# Patient Record
Sex: FEMALE | Race: OTHER
[De-identification: names, ages, dates, MRNs, and addresses within clinical notes are randomized per-mention and may not be internally consistent; named-entity substitution may affect disease eponyms.]

---

## 2021-04-15 PROBLEM — Z00.00 ENCOUNTER FOR PREVENTIVE HEALTH EXAMINATION: Status: ACTIVE | Noted: 2021-04-15

## 2021-04-16 ENCOUNTER — APPOINTMENT (OUTPATIENT)
Dept: NEUROLOGY | Facility: CLINIC | Age: 66
End: 2021-04-16
Payer: MEDICARE

## 2021-04-16 PROCEDURE — 99204 OFFICE O/P NEW MOD 45 MIN: CPT | Mod: 95

## 2021-04-16 RX ORDER — APIXABAN 5 MG/1
5 TABLET, FILM COATED ORAL TWICE DAILY
Qty: 60 | Refills: 1 | Status: ACTIVE | COMMUNITY
Start: 2021-04-16 | End: 1900-01-01

## 2021-04-16 NOTE — HISTORY OF PRESENT ILLNESS
[FreeTextEntry1] : Pt is 66 yo RH F with hx of high chol seen for recent stroke\par \par Pt seen via telehealth. two separate attempts with video conference (jaison bassett) attempted, but pt unable to connect. Therefore, evaluation done over the phone\par \par Pt was taken to [facility] on 12/14/20 after family noted sudden onset of slurred speech. Pt was lkw around 8am, arrived at ER around 12-12:30.  As per hospital report, pt with initial NIHSS 4, and ct head finding of acute right basal ganglia bleed. tpa not given due to bleed, and pt was monitored over several days for stability of bleed, then discharged home with recommendation for neuro/cardio/med follow up.  Of note, pt noted with parox afib during the hospitalization.\par \par Pt reports no recurrent episodes since. Denies any prior focal weakness/numbness

## 2021-04-16 NOTE — REASON FOR VISIT
[Home] : at home, [unfilled] , at the time of the visit. [Medical Office: ([facility])___] : at the medical office located in  [Verbal consent obtained from patient] : the patient, [unfilled] [Initial Evaluation] : an initial evaluation

## 2021-04-16 NOTE — DISCUSSION/SUMMARY
[FreeTextEntry1] : Pt with hx of hemorrhagic stroke in Dec 2020, and inhospital afib\par \par Pt stable since the incident with repeat ct head done yesterday (3 months from episode) with no bleed.\par MRI head done at the time of the hospitalization showed right caudate acute infarct in additional to the right basal ganglia bleed.  Therefore, we cannot rule out possible stroke and hemorrhagic conversion for the recent right basal ganglia bleed\par case discussed with Dr Woodard (pt's cardiologist), who agrees on benefit of pt on ac\par pt planning to visit family in [place] for 1-2 months starting on Monday.  Eliwuis 5mg bid with 1 refil ordered\par pt recommended to follow up with me in 2-3 months.

## 2021-05-06 ENCOUNTER — APPOINTMENT (OUTPATIENT)
Dept: NEUROLOGY | Facility: CLINIC | Age: 66
End: 2021-05-06
Payer: MEDICARE

## 2021-05-06 VITALS
OXYGEN SATURATION: 99 % | HEART RATE: 78 BPM | TEMPERATURE: 97.8 F | DIASTOLIC BLOOD PRESSURE: 75 MMHG | HEIGHT: 65 IN | BODY MASS INDEX: 24.99 KG/M2 | WEIGHT: 150 LBS | SYSTOLIC BLOOD PRESSURE: 108 MMHG

## 2021-05-06 DIAGNOSIS — I48.91 UNSPECIFIED ATRIAL FIBRILLATION: ICD-10-CM

## 2021-05-06 DIAGNOSIS — I61.9 NONTRAUMATIC INTRACEREBRAL HEMORRHAGE, UNSPECIFIED: ICD-10-CM

## 2021-05-06 PROCEDURE — 99215 OFFICE O/P EST HI 40 MIN: CPT

## 2021-05-06 NOTE — ASSESSMENT
[FreeTextEntry1] : Pt with hx of afib and hemorrhagic stroke (thought to be possible hemorrhagic conversion), started on ac. stable neuro exam\par \par - cont with ac: pt wants to switch to xarelto due to insurance approval issue: will defer to pt's cardiologist\par - bp goal: normotensive\par - cont with statins\par - follow up with me in 6 months

## 2021-05-06 NOTE — PHYSICAL EXAM
[FreeTextEntry1] : Neuro Exam\par MS: AAOx3, follows commands, good comprehension, fund of knowledge appears intact, no aphasia, no dysarthria\par CN:  PERRL, EOMI, peripheral vision intact, v1-v3 intact, hearing intact, no facial asymmetry, tongue & uvula midline, shoulder shrug equal strength\par Motor:  5/5 no drift, no rigidity, no abnormal atrophy\par Sensory: Lt/PP intact\par Coord: no tremors\par DTR: 0\par \par

## 2021-05-06 NOTE — HISTORY OF PRESENT ILLNESS
[FreeTextEntry1] : Pt is 64 yo RH F with hx of high chol seen for recent stroke, seen for follow up\par \par Pt was taken to [facility] on 12/14/20 after family noted sudden onset of slurred speech. Pt was lkw around 8am, arrived at ER around 12-12:30.  As per hospital report, pt with initial NIHSS 4, and ct head finding of acute right basal ganglia bleed. tpa not given due to bleed, and pt was monitored over several days for stability of bleed, then discharged home with recommendation for neuro/cardio/med follow up.  Of note, pt noted with parox afib during the hospitalization.\par \par Pt was then seen by me via telehealth on 4/16. at the time, pt with no deficits. Given the finding of parox afib, case was discussed with pt's cardiologist and Eliquis was started\par \par Pt seen today in the office. AAox3. no HA, no blurry vision, no nausea. no recurrent of slurred speech.\par \par

## 2021-12-10 ENCOUNTER — APPOINTMENT (OUTPATIENT)
Dept: NEUROLOGY | Facility: CLINIC | Age: 66
End: 2021-12-10